# Patient Record
Sex: MALE | Race: WHITE | NOT HISPANIC OR LATINO
[De-identification: names, ages, dates, MRNs, and addresses within clinical notes are randomized per-mention and may not be internally consistent; named-entity substitution may affect disease eponyms.]

---

## 2017-07-26 ENCOUNTER — RESULT REVIEW (OUTPATIENT)
Age: 77
End: 2017-07-26

## 2017-11-08 ENCOUNTER — RESULT REVIEW (OUTPATIENT)
Age: 77
End: 2017-11-08

## 2019-04-01 ENCOUNTER — APPOINTMENT (OUTPATIENT)
Dept: CARDIOLOGY | Facility: CLINIC | Age: 79
End: 2019-04-01

## 2019-04-03 ENCOUNTER — RESULT REVIEW (OUTPATIENT)
Age: 79
End: 2019-04-03

## 2019-04-04 ENCOUNTER — RESULT REVIEW (OUTPATIENT)
Age: 79
End: 2019-04-04

## 2019-04-12 ENCOUNTER — RESULT REVIEW (OUTPATIENT)
Age: 79
End: 2019-04-12

## 2019-04-25 ENCOUNTER — HOSPITAL ENCOUNTER (INPATIENT)
Dept: HOSPITAL 74 - JER | Age: 79
DRG: 208 | End: 2019-04-25
Attending: GENERAL ACUTE CARE HOSPITAL | Admitting: GENERAL ACUTE CARE HOSPITAL
Payer: COMMERCIAL

## 2019-04-25 VITALS — TEMPERATURE: 97.7 F | DIASTOLIC BLOOD PRESSURE: 64 MMHG | SYSTOLIC BLOOD PRESSURE: 92 MMHG

## 2019-04-25 VITALS — HEART RATE: 130 BPM

## 2019-04-25 VITALS — BODY MASS INDEX: 14.5 KG/M2

## 2019-04-25 DIAGNOSIS — E78.5: ICD-10-CM

## 2019-04-25 DIAGNOSIS — Z66: ICD-10-CM

## 2019-04-25 DIAGNOSIS — E87.2: ICD-10-CM

## 2019-04-25 DIAGNOSIS — I50.9: ICD-10-CM

## 2019-04-25 DIAGNOSIS — D64.9: ICD-10-CM

## 2019-04-25 DIAGNOSIS — Z79.84: ICD-10-CM

## 2019-04-25 DIAGNOSIS — R64: ICD-10-CM

## 2019-04-25 DIAGNOSIS — J44.1: ICD-10-CM

## 2019-04-25 DIAGNOSIS — G93.41: ICD-10-CM

## 2019-04-25 DIAGNOSIS — E11.9: ICD-10-CM

## 2019-04-25 DIAGNOSIS — J96.22: Primary | ICD-10-CM

## 2019-04-25 DIAGNOSIS — I11.0: ICD-10-CM

## 2019-04-25 DIAGNOSIS — L98.429: ICD-10-CM

## 2019-04-25 LAB
ALBUMIN SERPL-MCNC: 2.2 G/DL (ref 3.4–5)
ALP SERPL-CCNC: 68 U/L (ref 45–117)
ALT SERPL-CCNC: 15 U/L (ref 13–61)
ANION GAP SERPL CALC-SCNC: 3 MMOL/L (ref 8–16)
ANISOCYTOSIS BLD QL: (no result)
APPEARANCE UR: (no result)
APTT BLD: 29 SECONDS (ref 25.2–36.5)
ARTERIAL BLOOD GAS PCO2: 163 MMHG (ref 35–45)
ARTERIAL BLOOD GAS PCO2: 39.9 MMHG (ref 35–45)
ARTERIAL PATENCY WRIST A: POSITIVE
AST SERPL-CCNC: 14 U/L (ref 15–37)
BACTERIA #/AREA URNS HPF: 15.2 /HPF
BASE EXCESS BLDA CALC-SCNC: 6.5 MEQ/L (ref -2–2)
BASE EXCESS BLDA CALC-SCNC: 9 MEQ/L (ref -2–2)
BILIRUB SERPL-MCNC: 0.3 MG/DL (ref 0.2–1)
BILIRUB UR STRIP.AUTO-MCNC: NEGATIVE MG/DL
BNP SERPL-MCNC: 1086.2 PG/ML (ref 5–450)
BUN SERPL-MCNC: 34 MG/DL (ref 7–18)
CALCIUM SERPL-MCNC: 8.2 MG/DL (ref 8.5–10.1)
CASTS #/AREA URNS LPF: 14 /LPF (ref 0–8)
CHLORIDE SERPL-SCNC: 96 MMOL/L (ref 98–107)
CO2 SERPL-SCNC: > 45 MMOL/L (ref 21–32)
COHGB MFR BLD: 0.5 % (ref 0–2)
COLOR UR: (no result)
CREAT SERPL-MCNC: 0.8 MG/DL (ref 0.55–1.3)
DEPRECATED RDW RBC AUTO: 17.2 % (ref 11.9–15.9)
EPITH CASTS URNS QL MICRO: 1.7 /HPF
GLUCOSE SERPL-MCNC: 130 MG/DL (ref 74–106)
HCT VFR BLD CALC: 31.5 % (ref 35.4–49)
HGB BLD-MCNC: 9.9 GM/DL (ref 11.7–16.9)
INR BLD: 0.85 (ref 0.83–1.09)
KETONES UR QL STRIP: (no result)
LEUKOCYTE ESTERASE UR QL STRIP.AUTO: (no result)
MACROCYTES BLD QL: (no result)
MAGNESIUM SERPL-MCNC: 2.2 MG/DL (ref 1.8–2.4)
MCH RBC QN AUTO: 28.8 PG (ref 25.7–33.7)
MCHC RBC AUTO-ENTMCNC: 31.3 G/DL (ref 32–35.9)
MCV RBC: 92 FL (ref 80–96)
NITRITE UR QL STRIP: NEGATIVE
OVALOCYTES BLD QL SMEAR: (no result)
PH UR: 7 [PH] (ref 5–8)
PLATELET # BLD AUTO: 258 K/MM3 (ref 134–434)
PLATELET BLD QL SMEAR: NORMAL
PMV BLD: 8 FL (ref 7.5–11.1)
PO2 BLDA: 396 MMHG (ref 80–105)
PO2 BLDA: 58 MMHG (ref 80–105)
POTASSIUM SERPLBLD-SCNC: 3.5 MMOL/L (ref 3.5–5.1)
PROT SERPL-MCNC: 4.6 G/DL (ref 6.4–8.2)
PROT UR QL STRIP: (no result)
PROT UR QL STRIP: NEGATIVE
PT PNL PPP: 10 SEC (ref 9.7–13)
RBC # BLD AUTO: 3.42 M/MM3 (ref 4–5.6)
RBC # BLD AUTO: 952.7 /HPF (ref 0–4)
SAO2 % BLDA: 81.2 % (ref 95–98)
SAO2 % BLDA: 99.6 % (ref 95–98)
SODIUM SERPL-SCNC: 144 MMOL/L (ref 136–145)
SP GR UR: 1.01 (ref 1.01–1.03)
UROBILINOGEN UR STRIP-MCNC: 0.2 MG/DL (ref 0.2–1)
WBC # BLD AUTO: 6.7 K/MM3 (ref 4–10)
WBC # UR AUTO: 319 /HPF (ref 0–5)
YEAST #/AREA URNS HPF: PRESENT /[HPF]

## 2019-04-25 PROCEDURE — 0BH17EZ INSERTION OF ENDOTRACHEAL AIRWAY INTO TRACHEA, VIA NATURAL OR ARTIFICIAL OPENING: ICD-10-PCS | Performed by: EMERGENCY MEDICINE

## 2019-04-25 PROCEDURE — 5A09357 ASSISTANCE WITH RESPIRATORY VENTILATION, LESS THAN 24 CONSECUTIVE HOURS, CONTINUOUS POSITIVE AIRWAY PRESSURE: ICD-10-PCS | Performed by: EMERGENCY MEDICINE

## 2019-04-25 PROCEDURE — B513ZZA FLUOROSCOPY OF RIGHT JUGULAR VEINS, GUIDANCE: ICD-10-PCS | Performed by: INTERNAL MEDICINE

## 2019-04-25 PROCEDURE — 3E0F7GC INTRODUCTION OF OTHER THERAPEUTIC SUBSTANCE INTO RESPIRATORY TRACT, VIA NATURAL OR ARTIFICIAL OPENING: ICD-10-PCS | Performed by: EMERGENCY MEDICINE

## 2019-04-25 PROCEDURE — 0BH172Z INSERTION OF MONITORING DEVICE INTO TRACHEA, VIA NATURAL OR ARTIFICIAL OPENING: ICD-10-PCS | Performed by: EMERGENCY MEDICINE

## 2019-04-25 PROCEDURE — 5A1935Z RESPIRATORY VENTILATION, LESS THAN 24 CONSECUTIVE HOURS: ICD-10-PCS | Performed by: EMERGENCY MEDICINE

## 2019-04-25 PROCEDURE — 05HM33Z INSERTION OF INFUSION DEVICE INTO RIGHT INTERNAL JUGULAR VEIN, PERCUTANEOUS APPROACH: ICD-10-PCS | Performed by: INTERNAL MEDICINE

## 2019-04-25 NOTE — PROC
Central Line Insertion


Indication: Vasopressor


Risks and Benefits Explained: Yes


Consent on Chart: Yes


Central Line: Triple Lumen Catheter


Anesthesia: 1% Lidocaine


Sterile Technique: Yes


Ultrasound Guided Assistance: Yes


Position: Right Internal Jugular


Post Insertion: Yes: Chest X-Ray Ordered


Sterile Dressing Applied: Yes

## 2019-04-25 NOTE — HP
CHIEF COMPLAINT:





PCP:





HISTORY OF PRESENT ILLNESS:


This is an 79 yo M with PMH of COPD, asthma, CHF, HLD, recently hospitalized at 

[place] mar 30-apr 13 with pna, pleural effusions with chest tube, presenting 

from [facility] due to AMS and respiratory distress. EMS states that patient was 

found to be unresponsive at 845 am, was seen aaox3 last night. condition did 

not improve with nebs and nonrebreather. In ED patient was shown to be in 

hypercarbic respiratory failure pH 7.05 and pCO2 of 163 on abg. was intubated 

peep 5 tidal vol 500, fio2 100%, rate 18. Patient became hypotenisve and 

bradycardic MAP of 50 - 60, received 1 epi, bicarb and 2L iv ns. 2 IO were 

established and he was started on levo @ 15 and fentanyl. ESTEBAN stabilized to 150'

s systolic and hr 130's








ER course was notable for:


(1)cxr ekg


(2)labs


(3)as above 





Recent Travel:unknown





PAST MEDICAL HISTORY: as above


 


PAST SURGICAL HISTORY: as above 





Social History:


Smoking: unknown 


Alcohol:unknown


Drugs: unknown





Family History: unknown


Allergies





No Known Allergies Allergy (Verified 04/25/19 09:32)


 








HOME MEDICATIONS:


 Home Medications











 Medication  Instructions  Recorded


 


Aspirin Coated [Ecotrin] 81 mg PO DAILY 10/07/12


 


Atorvastatin Ca [Lipitor] 10 mg PO DAILY 10/07/12


 


Enalapril Maleate [Vasotec] 10 mg PO DAILY 10/07/12


 


predniSONE [Deltasone -] 10 mg PO DAILY 10/07/12








REVIEW OF SYSTEMS


unable to obtain 








PHYSICAL EXAMINATION


 Vital Signs - 24 hr











  04/25/19





  09:22


 


Pulse Rate 116 H


 


Respiratory 14





Rate 


 


Blood Pressure 90/47 L


 


O2 Sat by Pulse 100





Oximetry (%) 











GENERAL: sedated, cachectic


HEAD: Normal with no signs of trauma.


EYES: Pupils pinpoint, conjunctiva clear


EARS, NOSE, THROAT: somewhat dry mucous membranes.


NECK: supple + JVD


LUNGS: dissude ronchi, may be upper respiratory secretions 


HEART: tachy rate and regular rhythm, normal S1 and S2 


ABDOMEN: Soft, not distended, globally decreased bowel sounds, no masses.  


MUSCULOSKELETAL: No bony deformities 


UPPER EXTREMITIES: No peripheral edema.


LOWER EXTREMITIES: No peripheral edema. 


NEUROLOGICAL:  unable to assess 


PSYCHIATRIC: unable to assess


SKIN: Warm, dry, decreased turgor


 Laboratory Results - last 24 hr











  04/25/19 04/25/19 04/25/19





  09:42 10:00 10:00


 


WBC   6.7 


 


RBC   3.42 L 


 


Hgb   9.9 L 


 


Hct   31.5 L 


 


MCV   92.0 


 


MCH   28.8 


 


MCHC   31.3 L 


 


RDW   17.2 H 


 


Plt Count   258 


 


MPV   8.0 


 


Absolute Neuts (auto)   5.0 


 


Neutrophils %   No Result Required. 


 


Lymphocytes %   No Result Required. 


 


Nucleated RBC %   0 


 


PT with INR    10.00


 


INR    0.85


 


PTT (Actin FS)    29.0


 


Anticoagulation Therapy   


 


Puncture Site   


 


ABG pH   


 


ABG pCO2 at Pt Temp   


 


ABG pO2 at Pt Temp   


 


ABG HCO3   


 


ABG O2 Sat (Measured)   


 


ABG O2 Content   


 


ABG Base Excess   


 


Santino Test   


 


Carboxyhemoglobin   


 


Methemoglobin   


 


O2 Delivery Device   


 


Oxygen Flow Rate   


 


Vent Mode   


 


Vent Rate   


 


Mechanical Rate   


 


Pressure Support Vent   


 


Sodium   


 


Potassium   


 


Chloride   


 


Carbon Dioxide   


 


Anion Gap   


 


BUN   


 


Creatinine   


 


Creat Clearance w eGFR   


 


POC Glucometer  121  


 


Random Glucose   


 


Lactic Acid   


 


Calcium   


 


Total Bilirubin   


 


AST   


 


ALT   


 


Alkaline Phosphatase   


 


Creatine Kinase   


 


B-Natriuretic Peptide   


 


Total Protein   


 


Albumin   














  04/25/19 04/25/19 04/25/19





  10:00 10:00 10:00


 


WBC   


 


RBC   


 


Hgb   


 


Hct   


 


MCV   


 


MCH   


 


MCHC   


 


RDW   


 


Plt Count   


 


MPV   


 


Absolute Neuts (auto)   


 


Neutrophils %   


 


Lymphocytes %   


 


Nucleated RBC %   


 


PT with INR   


 


INR   


 


PTT (Actin FS)   


 


Anticoagulation Therapy   


 


Puncture Site   


 


ABG pH   


 


ABG pCO2 at Pt Temp   


 


ABG pO2 at Pt Temp   


 


ABG HCO3   


 


ABG O2 Sat (Measured)   


 


ABG O2 Content   


 


ABG Base Excess   


 


Santino Test   


 


Carboxyhemoglobin   


 


Methemoglobin   


 


O2 Delivery Device   


 


Oxygen Flow Rate   


 


Vent Mode   


 


Vent Rate   


 


Mechanical Rate   


 


Pressure Support Vent   


 


Sodium  144  


 


Potassium  3.5  


 


Chloride  96 L  


 


Carbon Dioxide  > 45 H  


 


Anion Gap  3 L  


 


BUN  34 H  


 


Creatinine  0.8  


 


Creat Clearance w eGFR  93.01  


 


POC Glucometer   


 


Random Glucose  130 H  


 


Lactic Acid   0.8 


 


Calcium  8.2 L  


 


Total Bilirubin  0.3  


 


AST  14 L  


 


ALT  15  


 


Alkaline Phosphatase  68  


 


Creatine Kinase  28  


 


B-Natriuretic Peptide    1086.2 H


 


Total Protein  4.6 L  


 


Albumin  2.2 L  














  04/25/19





  10:11


 


WBC 


 


RBC 


 


Hgb 


 


Hct 


 


MCV 


 


MCH 


 


MCHC 


 


RDW 


 


Plt Count 


 


MPV 


 


Absolute Neuts (auto) 


 


Neutrophils % 


 


Lymphocytes % 


 


Nucleated RBC % 


 


PT with INR 


 


INR 


 


PTT (Actin FS) 


 


Anticoagulation Therapy  No Result Required.


 


Puncture Site  No Result Required.


 


ABG pH  7.05 L*


 


ABG pCO2 at Pt Temp  163 H*


 


ABG pO2 at Pt Temp  58.0 L


 


ABG HCO3  43.5 H


 


ABG O2 Sat (Measured)  81.2 L


 


ABG O2 Content  11.3 L


 


ABG Base Excess  9.0 H


 


Santino Test  No Result Required.


 


Carboxyhemoglobin  0.5


 


Methemoglobin  0.8


 


O2 Delivery Device  No Result Required.


 


Oxygen Flow Rate  No Result Required.


 


Vent Mode  No Result Required.


 


Vent Rate  No Result Required.


 


Mechanical Rate  No Result Required.


 


Pressure Support Vent  No Result Required.


 


Sodium 


 


Potassium 


 


Chloride 


 


Carbon Dioxide 


 


Anion Gap 


 


BUN 


 


Creatinine 


 


Creat Clearance w eGFR 


 


POC Glucometer 


 


Random Glucose 


 


Lactic Acid 


 


Calcium 


 


Total Bilirubin 


 


AST 


 


ALT 


 


Alkaline Phosphatase 


 


Creatine Kinase 


 


B-Natriuretic Peptide 


 


Total Protein 


 


Albumin 











ASSESSMENT/PLAN:


This is an 79 yo M with PMH of COPD, asthma, CHF, HLD, recently hospitalized at 

[place] mar 30-apr 13 with pna, pleural effusions with chest tube, presenting 

from [facility] due to AMS and respiratory distress. 





acute hypercapneic respiratory failure 


copd exacerbation 


chf, currently euvolemic 


hld 


-most likely copd exacerbation causing metabolic encephalopathy due to 

hypercpnea and acidosis, leading to autonomic dysfunction


-intubated, currently breath stacking, start on propofol with fentanyl, check 

another abg and adjust vent setting accordingly 


-nebs, medrol and iv abx per ID consult


-wean levo below 12 add vaso


-f/u sputum and blood cultures 


-hep ppx 


-daily cxr 


-hold additional ivf for now

## 2019-04-25 NOTE — PDOC
Documentation entered by Kamala Zuñiga SCRIBE, acting as scribe for Pancho Sumner MD.








Pancho Sumner MD:  This documentation has been prepared by the Yogesh beavers Amanda, SCRIBE, under my direction and personally reviewed by me in its entirety.  I 

confirm that the documentation accurately reflects all work, treatment, 

procedures, and medical decision making performed by me.  





Attending Attestation





- Resident


Resident Name: Aaron Balbuena





- ED Attending Attestation


I have performed the following: I have examined & evaluated the patient, The 

case was reviewed & discussed with the resident, I agree w/resident's findings 

& plan, Exceptions are as noted





- HPI


HPI: 





04/25/19 10:57


78 M with h/o asthma/COPD, CHF, DM, HTN, HLD presenting to ED in respiratory 

distress. Per daughter, pt was recently diagnosed with PNA. Over the past 

several days, he has had worsening difficulty breathing. At the NH, they were 

giving him nebulizer treatments every 4 hours. However, daughter notes that 

last night, he appeared to be getting very tired. Today, EMS was called for 

respiratory distress.


Per EMS, pt was encountered obtunded, satting 70%. He was given O2 via NRB and 

brought to ED.





In ED, pt arrived unresponsive, with minimal air movement.


Vitals upon arrival notable for O2 80% on NRB, BP 80/40.


Bilateral tibial IOs placed due to poor venous access and multiple failed 

attempts at peripheral IVs


Pt was given 1L NS, duonebs, solumedrol.


Trial of BiPAP with improvement in O2 to 100% but persistently obtunded


BP improved to 110/60 after 1L NS





Ketamine and rocuronium given for RSI


Pt intubated using DL with 7.5 ET tube





Post-intubation, pt became bradycardic and hypotensive.


Epi 1mg IV given, with improvement in vitals





Pt started on levophed gtt





- Physicial Exam


PE: 





04/25/19 11:09


GENERAL: Unresponsive, breathing agonally


HEAD: No signs of trauma


EYES: PERRLA, EOMI, sclera anicteric, conjunctiva clear


ENT: Auricles normal inspection, hearing grossly normal, nares patent, 

oropharynx clear without exudates. Moist mucosa


NECK: Nontender, no stepoffs, Normal ROM, supple, no lymphadenopathy, JVD, or 

masses


LUNGS: + wheezing, poor air movement


HEART: Regular rate and rhythm, normal S1 and S2, no murmurs, rubs or gallops


ABDOMEN: Soft, nontender, normoactive bowel sounds.  No guarding, no rebound.  

No masses


EXTREMITIES: Normal range of motion, no edema.  No clubbing or cyanosis. No 

cords, erythema, or tenderness


NEUROLOGICAL: unable to assess


SKIN: Warm, Dry, normal turgor, no rashes or lesions noted.





- Critical Care Time


Total Critical Care Time: 120


Critical Care Statement: The care of this patient involved high complexity 

decision making to prevent further life threatening deterioration of the patient

's condition and/or to evaluate & treat vital organ system(s) failure or risk 

of failure.





- Medical Decision Making





04/25/19 11:15


78 M with respiratory failure, likely 2/2 severe COPD exacerbation. No clinical 

signs of volume overload on exam.


ABG with CO2 >160, pH 7.05


Will continue nebs, steroids


Pt now intubated on levo gtt


Hypotension and bradycardia during intubation likely 2/2 severe acidosis


However, will cover empirically for sepsis as well





Pt to be admitted to ICU





04/25/19 11:42


Pt accepted to ICU

## 2019-04-25 NOTE — PDOC
History of Present Illness





- General


Chief Complaint: Shortness of Breath


Stated Complaint: Shortness of Breath


History Source: Patient


Exam Limitations: No Limitations





- History of Present Illness


Initial Comments: 





04/25/19 10:43


77 yo M with a hx of HLD, COPD vs asthma, and CHF presents to the emergency 

department with AMS with respiratory depression. Per EMS, the patient was last 

seen normal yesterday and per the nursing staff he was not responding with 

increased respiratory rate while on non-rebreather. In the emergency department

, the patient was not responsive to voice and stimuli. Unable to assess ROS. 











Past History





- Past Medical History


Allergies/Adverse Reactions: 


 Allergies











Allergy/AdvReac Type Severity Reaction Status Date / Time


 


amoxicillin Allergy Unknown  Verified 04/25/19 12:52











Home Medications: 


Ambulatory Orders





Atorvastatin Ca [Lipitor] 10 mg PO HS 04/25/19 


Calcium Carbonate/Vitamin D3 [Calcium 600-Vit D3 400 Tablet] 1 each PO DAILY 04/ 25/19 


Docusate Sodium [Colace] 100 mg PO BID 04/25/19 


Fluticasone/Salmeterol [Advair 250-50 Diskus] 1 each IH BID 04/25/19 


Furosemide [Lasix] 40 mg PO DAILY 04/25/19 


Ipratropium/Albuterol Sulfate [Iprat-Albut 0.5-3(2.5) mg/3 ml] 3 ml IH Q4H 04/25 /19 


Metformin HCl [Metformin HCl ER] 500 mg PO BID 04/25/19 


Nut.tx.gluc.intoler,Lac-Fr,Soy [Glucerna Advance] 240 ml PO DAILY 04/25/19 


Prednisone 5 mg PO BID 04/25/19 


Sennosides [Sen-O-Tab] 8.6 mg PO HS 04/25/19 


Tamsulosin HCl 0.4 mg PO HS 04/25/19 


Tiotropium Bromide [Spiriva] 2 inh PO DAILY 04/25/19 


Triamcinolone 0.025% Cream [Aristocort] 0 gm TP DAILY 04/25/19 








Asthma: Yes


COPD: No


HTN: Yes


Hypercholesterolemia: Yes





- Immunization History


Td Vaccination: Yes (WITHIN 5 YRS)


Immunization Up to Date: Yes





- Suicide/Smoking/Psychosocial Hx


Smoking Status: Yes


Smoking History: Unknown if ever smoked


Have you smoked in the past 12 months: No


Number of Cigarettes Smoked Daily: 0


Information on smoking cessation initiated: No


Hx Alcohol Use: No


Drug/Substance Use Hx: No


Substance Use Type: None





**Review of Systems





- Review of Systems


Able to Perform ROS?: No (AMS/Unresponsive/Intubate)





*Physical Exam





- Vital Signs


 Last Vital Signs











Temp Pulse Resp BP Pulse Ox


 


    116 H  14   90/47 L  100 


 


    04/25/19 09:22  04/25/19 09:22  04/25/19 09:22  04/25/19 09:22














- Physical Exam


General Appearance: Yes: Appropriately Dressed, Disheveled, Cachetic, Thin.  No

: Nourished, Apparent Distress


HEENT: negative: EOMI, JAZZ (right eye reactive to light. Left pupil more 

dilated than right significantly without reaction to light), Pharynx Normal (

dry mucous membranes), Scleral Icterus (R), Scleral Icterus (L), Pharyngeal 

Erythema, Tonsillar Exudate, Tonsillar Erythema


Neck: positive: Trachea midline, Supple, Other (kyphotic ).  negative: 

Lymphadenopathy (R), Lymphadenopathy (L)


Respiratory/Chest: positive: Respiratory Distress, Accessory Muscle Use, 

Labored Respiration, Rapid RR, Decreased Breath Sounds, Rhonchi, Wheezing.  

negative: Lungs Clear, Normal Breath Sounds


Cardiovascular: positive: S1, S2, Tachycardia, Irregularly Irregular


Gastrointestinal/Abdominal: positive: Normal Bowel Sounds, Flat, Soft.  negative

: Tender


Lymphatic: negative: Adenopathy


Musculoskeletal: positive: Other (multiple eccymosis throughout arms. left 

incisicion old on left side 4-5th intercostal space. multiple stage 2 ulcers on 

back and left buttock)


Extremity: positive: Coldness, Delayed Capillary Refill.  negative: Normal 

Capillary Refill (decreased)


Integumentary: positive: Dry, Warm, Pale


Neurologic: negative: Fully Oriented, Alert, Normal Mood/Affect, Normal Response





Procedures





- Central Line


Central Line Lumen: triple


Central Line Position: internal jugular (R)


Anesthesia: 1% Lidocaine


Amount of anesthesia (ccs): 3


Complications: none


Post Central Line Insertion: sutured, good blood return, position confirmed w/ 

CXR





- Intubation


Intubation Method: orotracheal


Blade used: Mac


Tube Size (Fr): 7.5


Medications: Ketamine, Rocuronium


Tube position @ lip (cm): 22


Tube position confirmed by: Direct visualization, CO2 detector, Chest x-ray, 

Breath sounds


Breath Sounds after Intubation: equal


Intubation Complications: no complications


Post Intubation Xray: Yes (in place)





ED Treatment Course





- LABORATORY


CBC & Chemistry Diagram: 


 04/25/19 10:00





 04/25/19 10:00





- ADDITIONAL ORDERS


Additional order review: 


 Laboratory  Results











  04/25/19 04/25/19





  10:11 09:42


 


Anticoagulation Therapy  No Result Required. 


 


Puncture Site  No Result Required. 


 


ABG pH  7.05 L* 


 


ABG pCO2 at Pt Temp  163 H* 


 


ABG pO2 at Pt Temp  58.0 L 


 


ABG HCO3  43.5 H 


 


ABG O2 Sat (Measured)  81.2 L 


 


ABG O2 Content  11.3 L 


 


ABG Base Excess  9.0 H 


 


Santino Test  No Result Required. 


 


Carboxyhemoglobin  0.5 


 


Methemoglobin  0.8 


 


O2 Delivery Device  No Result Required. 


 


Oxygen Flow Rate  No Result Required. 


 


Vent Mode  No Result Required. 


 


Vent Rate  No Result Required. 


 


Mechanical Rate  No Result Required. 


 


Pressure Support Vent  No Result Required. 


 


POC Glucometer   121








 











  04/25/19





  09:42


 


POC Glucometer  121














Medical Decision Making





- Medical Decision Making





77 yo M with a hx of HLD, COPD vs asthma, and CHF presents to the emergency 

department with AMS with respiratory depression. Per the nursing staff, they 

went to his room at approximately 8:45 am. Nursing supervisor noted he was 

unresponsive with shallow breathing. Given nebulizer treatment and non-

rebreather. He was recently admitted to [facility] from [facility] on April 23rd. He 

was admitted to [facility] on March 30th for PNA and pleural effusion s/p 

chest tube (laterality unknown at this time). 





Initial vitals:


 Initial Vital Signs











Pulse Resp BP Pulse Ox


 


 116 H  14   90/47 L  100 


 


 04/25/19 09:22  04/25/19 09:22  04/25/19 09:22  04/25/19 09:22








Work up:


Patient presents to the emergency department in acute respiratory distress with 

altered mental status per EMS. I evaluated the patient and he was unresponsive 

to touch and voice. Patient had increased respirations with appropriate 

saturations (95% and greater) and using accessory muscles.





ddx for acute respiratory failure is sepsis vs COPD exacerbation vs PNA vs 

aspiration PNA vs HCAP (recently admitted to [facility]) vs CHF 

exacerbation. Was given solumedrol, duonebs. BG within normal limits. 





Patient was placed on BiPap and 2 L of NS were given to improve BP due to MAPs 

in the 50s. BP was improved with 2 L of NS bolus with MAPs >65. A decision was 

made by the family of the patient to intubate after advisement was given on the 

risks and benefits of the procedure. He was given bicarb for suspected acidosis 

given clinical impression and hx. The patient was successfully intubated on 

first attempt 7.5 tube with ketamine and rocuronium for sedation/paralysis. 





Patient began to have bradycardia and hypotensive. He was given epinephrine. 

Levophed was started when pressures were not changed by the epi with starting 

rate at 20 mcgs.





 Laboratory Tests











  04/25/19 04/25/19 04/25/19





  09:42 10:00 10:00


 


WBC   6.7 


 


RBC   3.42 L 


 


Hgb   9.9 L 


 


Hct   31.5 L 


 


MCV   92.0 


 


MCH   28.8 


 


MCHC   31.3 L 


 


RDW   17.2 H 


 


Plt Count   258 


 


MPV   8.0 


 


Absolute Neuts (auto)   5.0 


 


Neutrophils %   No Result Required. 


 


Lymphocytes %   No Result Required. 


 


Nucleated RBC %   0 


 


PT with INR    10.00


 


INR    0.85


 


PTT (Actin FS)    29.0


 


Anticoagulation Therapy   


 


Puncture Site   


 


ABG pH   


 


ABG pCO2 at Pt Temp   


 


ABG pO2 at Pt Temp   


 


ABG HCO3   


 


ABG O2 Sat (Measured)   


 


ABG O2 Content   


 


ABG Base Excess   


 


Santino Test   


 


Carboxyhemoglobin   


 


Methemoglobin   


 


O2 Delivery Device   


 


Oxygen Flow Rate   


 


Vent Mode   


 


Vent Rate   


 


Mechanical Rate   


 


Pressure Support Vent   


 


Sodium   


 


Potassium   


 


Chloride   


 


Carbon Dioxide   


 


Anion Gap   


 


BUN   


 


Creatinine   


 


Creat Clearance w eGFR   


 


POC Glucometer  121  


 


Random Glucose   


 


Lactic Acid   


 


Calcium   


 


Magnesium   


 


Total Bilirubin   


 


AST   


 


ALT   


 


Alkaline Phosphatase   


 


Creatine Kinase   


 


Troponin I   


 


B-Natriuretic Peptide   


 


Total Protein   


 


Albumin   














  04/25/19 04/25/19 04/25/19





  10:00 10:00 10:00


 


WBC   


 


RBC   


 


Hgb   


 


Hct   


 


MCV   


 


MCH   


 


MCHC   


 


RDW   


 


Plt Count   


 


MPV   


 


Absolute Neuts (auto)   


 


Neutrophils %   


 


Lymphocytes %   


 


Nucleated RBC %   


 


PT with INR   


 


INR   


 


PTT (Actin FS)   


 


Anticoagulation Therapy   


 


Puncture Site   


 


ABG pH   


 


ABG pCO2 at Pt Temp   


 


ABG pO2 at Pt Temp   


 


ABG HCO3   


 


ABG O2 Sat (Measured)   


 


ABG O2 Content   


 


ABG Base Excess   


 


Santino Test   


 


Carboxyhemoglobin   


 


Methemoglobin   


 


O2 Delivery Device   


 


Oxygen Flow Rate   


 


Vent Mode   


 


Vent Rate   


 


Mechanical Rate   


 


Pressure Support Vent   


 


Sodium  144  


 


Potassium  3.5  


 


Chloride  96 L  


 


Carbon Dioxide  > 45 H  


 


Anion Gap  3 L  


 


BUN  34 H  


 


Creatinine  0.8  


 


Creat Clearance w eGFR  93.01  


 


POC Glucometer   


 


Random Glucose  130 H  


 


Lactic Acid   0.8 


 


Calcium  8.2 L  


 


Magnesium    2.2


 


Total Bilirubin  0.3  


 


AST  14 L  


 


ALT  15  


 


Alkaline Phosphatase  68  


 


Creatine Kinase  28  


 


Troponin I    0.02


 


B-Natriuretic Peptide    1086.2 H


 


Total Protein  4.6 L  


 


Albumin  2.2 L  














  04/25/19





  10:11


 


WBC 


 


RBC 


 


Hgb 


 


Hct 


 


MCV 


 


MCH 


 


MCHC 


 


RDW 


 


Plt Count 


 


MPV 


 


Absolute Neuts (auto) 


 


Neutrophils % 


 


Lymphocytes % 


 


Nucleated RBC % 


 


PT with INR 


 


INR 


 


PTT (Actin FS) 


 


Anticoagulation Therapy  No Result Required.


 


Puncture Site  No Result Required.


 


ABG pH  7.05 L*


 


ABG pCO2 at Pt Temp  163 H*


 


ABG pO2 at Pt Temp  58.0 L


 


ABG HCO3  43.5 H


 


ABG O2 Sat (Measured)  81.2 L


 


ABG O2 Content  11.3 L


 


ABG Base Excess  9.0 H


 


Santino Test  No Result Required.


 


Carboxyhemoglobin  0.5


 


Methemoglobin  0.8


 


O2 Delivery Device  No Result Required.


 


Oxygen Flow Rate  No Result Required.


 


Vent Mode  No Result Required.


 


Vent Rate  No Result Required.


 


Mechanical Rate  No Result Required.


 


Pressure Support Vent  No Result Required.


 


Sodium 


 


Potassium 


 


Chloride 


 


Carbon Dioxide 


 


Anion Gap 


 


BUN 


 


Creatinine 


 


Creat Clearance w eGFR 


 


POC Glucometer 


 


Random Glucose 


 


Lactic Acid 


 


Calcium 


 


Magnesium 


 


Total Bilirubin 


 


AST 


 


ALT 


 


Alkaline Phosphatase 


 


Creatine Kinase 


 


Troponin I 


 


B-Natriuretic Peptide 


 


Total Protein 


 


Albumin 








No leukocytosis. anemia. ABG shows pH 7.05 and pco2 163. bicarb >45, likely 

compensating for acidosis. presumed to be respiratory acidosis. trop negative. 

EKG does not show ST elevations, but ST depressions in V4-V6 irregular rhythm. 

bnp elevated. lactic acid within normal limits. 





On secondary survey, patient noted to have unequal pupils L>R. Right reactive 

to light. left is not reactive to light. multiple stage 2 ulcers on back. 

Ordered head CT for evaluation of head bleed. 





Patient's BP recovered to MAPs in the 80s. Patient was endorses to hospitalist 

team and was accepted. Patient was endorses to ICU team and at this time, 11:40 

am, they stated they will come evaluated the patient. 





Will start vanco, azithro, and zosyn for empiric treatment for healthcare 

associated pneumonia. 








Dispo:


Admit to ICU








*DC/Admit/Observation/Transfer


Diagnosis at time of Disposition: 


Acute respiratory failure


Qualifiers:


 Respiratory failure complication: hypercapnia Qualified Code(s): J96.02 - 

Acute respiratory failure with hypercapnia








- Referrals





- Patient Instructions





- Post Discharge Activity

## 2019-04-25 NOTE — PN
Teaching Attending Note


Name of Resident: Fatou Cesar





ATTENDING PHYSICIAN STATEMENT





I saw and evaluated the patient.


I reviewed the resident's note and discussed the case with the resident.


I agree with the resident's findings and plan as documented.








SUBJECTIVE: Unresponsive, unable to participate in medical interview.








OBJECTIVE: Afebrile, tachycardic, hypotensive





 Last Vital Signs











Temp Pulse Resp BP Pulse Ox


 


 97.7 F   138 H  24 H  92/64   100 


 


 04/25/19 15:57  04/25/19 14:00  04/25/19 15:57  04/25/19 15:57  04/25/19 14:00








HEENT - Atraumatic, Normocephalic


Heart - S1, S2, SM


Lungs - decreased air entry bilaterally


Abdomen - Soft, bowel Sounds normal.


Extremities - no edema, no calf tenderness





 Laboratory Results - last 24 hr











  04/25/19 04/25/19 04/25/19





  09:42 10:00 10:00


 


WBC   6.7 


 


RBC   3.42 L 


 


Hgb   9.9 L 


 


Hct   31.5 L 


 


MCV   92.0 


 


MCH   28.8 


 


MCHC   31.3 L 


 


RDW   17.2 H 


 


Plt Count   258 


 


MPV   8.0 


 


Absolute Neuts (auto)   5.0 


 


Neutrophils %   No Result Required. 


 


Neutrophils % (Manual)   77.9 


 


Band Neutrophils %   2.1 


 


Lymphocytes %   No Result Required. 


 


Lymphocytes % (Manual)   14.7 


 


Monocytes % (Manual)   4 


 


Eosinophils % (Manual)   0.0 


 


Basophils % (Manual)   0.0 


 


Myelocytes % (Man)   1 


 


Promyelocytes % (Man)   0 


 


Blast Cells % (Manual)   0 


 


Nucleated RBC %   0 


 


Metamyelocytes   0 


 


Hypochromia   0 


 


Platelet Estimate   Normal 


 


Polychromasia   0 


 


Poikilocytosis   1+ 


 


Anisocytosis   1+ 


 


Microcytosis   0 


 


Macrocytosis   1+ 


 


Ovalocytes   1+ 


 


PT with INR    10.00


 


INR    0.85


 


PTT (Actin FS)    29.0


 


Anticoagulation Therapy   


 


Puncture Site   


 


ABG pH   


 


ABG pCO2 at Pt Temp   


 


ABG pO2 at Pt Temp   


 


ABG HCO3   


 


ABG O2 Sat (Measured)   


 


ABG O2 Content   


 


ABG Base Excess   


 


Santino Test   


 


Carboxyhemoglobin   


 


Methemoglobin   


 


O2 Delivery Device   


 


Oxygen Flow Rate   


 


Vent Mode   


 


Vent Rate   


 


Mechanical Rate   


 


PEEP   


 


Pressure Support Vent   


 


Sodium   


 


Potassium   


 


Chloride   


 


Carbon Dioxide   


 


Anion Gap   


 


BUN   


 


Creatinine   


 


Creat Clearance w eGFR   


 


POC Glucometer  121  


 


Random Glucose   


 


Lactic Acid   


 


Calcium   


 


Magnesium   


 


Total Bilirubin   


 


AST   


 


ALT   


 


Alkaline Phosphatase   


 


Creatine Kinase   


 


Troponin I   


 


B-Natriuretic Peptide   


 


Total Protein   


 


Albumin   


 


Urine Color   


 


Urine Appearance   


 


Urine pH   


 


Ur Specific Gravity   


 


Urine Protein   


 


Urine Glucose (UA)   


 


Urine Ketones   


 


Urine Blood   


 


Urine Nitrite   


 


Urine Bilirubin   


 


Urine Urobilinogen   


 


Ur Leukocyte Esterase   


 


Urine WBC (Auto)   


 


Urine RBC (Auto)   


 


Urine Casts (Auto)   


 


U Pathogenic Cast Auto   


 


U Epithel Cells (Auto)   


 


Urine Bacteria (Auto)   


 


Urine Yeast (Auto)   


 


Blood Type   


 


Antibody Screen   














  04/25/19 04/25/19 04/25/19





  10:00 10:00 10:00


 


WBC   


 


RBC   


 


Hgb   


 


Hct   


 


MCV   


 


MCH   


 


MCHC   


 


RDW   


 


Plt Count   


 


MPV   


 


Absolute Neuts (auto)   


 


Neutrophils %   


 


Neutrophils % (Manual)   


 


Band Neutrophils %   


 


Lymphocytes %   


 


Lymphocytes % (Manual)   


 


Monocytes % (Manual)   


 


Eosinophils % (Manual)   


 


Basophils % (Manual)   


 


Myelocytes % (Man)   


 


Promyelocytes % (Man)   


 


Blast Cells % (Manual)   


 


Nucleated RBC %   


 


Metamyelocytes   


 


Hypochromia   


 


Platelet Estimate   


 


Polychromasia   


 


Poikilocytosis   


 


Anisocytosis   


 


Microcytosis   


 


Macrocytosis   


 


Ovalocytes   


 


PT with INR   


 


INR   


 


PTT (Actin FS)   


 


Anticoagulation Therapy   


 


Puncture Site   


 


ABG pH   


 


ABG pCO2 at Pt Temp   


 


ABG pO2 at Pt Temp   


 


ABG HCO3   


 


ABG O2 Sat (Measured)   


 


ABG O2 Content   


 


ABG Base Excess   


 


Santino Test   


 


Carboxyhemoglobin   


 


Methemoglobin   


 


O2 Delivery Device   


 


Oxygen Flow Rate   


 


Vent Mode   


 


Vent Rate   


 


Mechanical Rate   


 


PEEP   


 


Pressure Support Vent   


 


Sodium  144  


 


Potassium  3.5  


 


Chloride  96 L  


 


Carbon Dioxide  > 45 H  


 


Anion Gap  3 L  


 


BUN  34 H  


 


Creatinine  0.8  


 


Creat Clearance w eGFR  93.01  


 


POC Glucometer   


 


Random Glucose  130 H  


 


Lactic Acid   0.8 


 


Calcium  8.2 L  


 


Magnesium   


 


Total Bilirubin  0.3  


 


AST  14 L  


 


ALT  15  


 


Alkaline Phosphatase  68  


 


Creatine Kinase  28  


 


Troponin I   


 


B-Natriuretic Peptide   


 


Total Protein  4.6 L  


 


Albumin  2.2 L  


 


Urine Color   


 


Urine Appearance   


 


Urine pH   


 


Ur Specific Gravity   


 


Urine Protein   


 


Urine Glucose (UA)   


 


Urine Ketones   


 


Urine Blood   


 


Urine Nitrite   


 


Urine Bilirubin   


 


Urine Urobilinogen   


 


Ur Leukocyte Esterase   


 


Urine WBC (Auto)   


 


Urine RBC (Auto)   


 


Urine Casts (Auto)   


 


U Pathogenic Cast Auto   


 


U Epithel Cells (Auto)   


 


Urine Bacteria (Auto)   


 


Urine Yeast (Auto)   


 


Blood Type    A POSITIVE


 


Antibody Screen    Negative














  04/25/19 04/25/19 04/25/19





  10:00 10:11 11:12


 


WBC   


 


RBC   


 


Hgb   


 


Hct   


 


MCV   


 


MCH   


 


MCHC   


 


RDW   


 


Plt Count   


 


MPV   


 


Absolute Neuts (auto)   


 


Neutrophils %   


 


Neutrophils % (Manual)   


 


Band Neutrophils %   


 


Lymphocytes %   


 


Lymphocytes % (Manual)   


 


Monocytes % (Manual)   


 


Eosinophils % (Manual)   


 


Basophils % (Manual)   


 


Myelocytes % (Man)   


 


Promyelocytes % (Man)   


 


Blast Cells % (Manual)   


 


Nucleated RBC %   


 


Metamyelocytes   


 


Hypochromia   


 


Platelet Estimate   


 


Polychromasia   


 


Poikilocytosis   


 


Anisocytosis   


 


Microcytosis   


 


Macrocytosis   


 


Ovalocytes   


 


PT with INR   


 


INR   


 


PTT (Actin FS)   


 


Anticoagulation Therapy   No Result Required. 


 


Puncture Site   No Result Required. 


 


ABG pH   7.05 L* 


 


ABG pCO2 at Pt Temp   163 H* 


 


ABG pO2 at Pt Temp   58.0 L 


 


ABG HCO3   43.5 H 


 


ABG O2 Sat (Measured)   81.2 L 


 


ABG O2 Content   11.3 L 


 


ABG Base Excess   9.0 H 


 


Santino Test   No Result Required. 


 


Carboxyhemoglobin   0.5 


 


Methemoglobin   0.8 


 


O2 Delivery Device   No Result Required. 


 


Oxygen Flow Rate   No Result Required. 


 


Vent Mode   No Result Required. 


 


Vent Rate   No Result Required. 


 


Mechanical Rate   No Result Required. 


 


PEEP   


 


Pressure Support Vent   No Result Required. 


 


Sodium   


 


Potassium   


 


Chloride   


 


Carbon Dioxide   


 


Anion Gap   


 


BUN   


 


Creatinine   


 


Creat Clearance w eGFR   


 


POC Glucometer   


 


Random Glucose   


 


Lactic Acid   


 


Calcium   


 


Magnesium  2.2  


 


Total Bilirubin   


 


AST   


 


ALT   


 


Alkaline Phosphatase   


 


Creatine Kinase   


 


Troponin I  0.02  


 


B-Natriuretic Peptide  1086.2 H  


 


Total Protein   


 


Albumin   


 


Urine Color    Orange


 


Urine Appearance    Cloudy


 


Urine pH    7.0


 


Ur Specific Gravity    1.010


 


Urine Protein    2+ H


 


Urine Glucose (UA)    Negative


 


Urine Ketones    Trace H


 


Urine Blood    3+ H


 


Urine Nitrite    Negative


 


Urine Bilirubin    Negative


 


Urine Urobilinogen    0.2


 


Ur Leukocyte Esterase    3+ H


 


Urine WBC (Auto)    319


 


Urine RBC (Auto)    952.7


 


Urine Casts (Auto)    14


 


U Pathogenic Cast Auto    None seen


 


U Epithel Cells (Auto)    1.7


 


Urine Bacteria (Auto)    15.2


 


Urine Yeast (Auto)    Present


 


Blood Type   


 


Antibody Screen   














  04/25/19 04/25/19





  14:07 14:35


 


WBC  


 


RBC  


 


Hgb  


 


Hct  


 


MCV  


 


MCH  


 


MCHC  


 


RDW  


 


Plt Count  


 


MPV  


 


Absolute Neuts (auto)  


 


Neutrophils %  


 


Neutrophils % (Manual)  


 


Band Neutrophils %  


 


Lymphocytes %  


 


Lymphocytes % (Manual)  


 


Monocytes % (Manual)  


 


Eosinophils % (Manual)  


 


Basophils % (Manual)  


 


Myelocytes % (Man)  


 


Promyelocytes % (Man)  


 


Blast Cells % (Manual)  


 


Nucleated RBC %  


 


Metamyelocytes  


 


Hypochromia  


 


Platelet Estimate  


 


Polychromasia  


 


Poikilocytosis  


 


Anisocytosis  


 


Microcytosis  


 


Macrocytosis  


 


Ovalocytes  


 


PT with INR  


 


INR  


 


PTT (Actin FS)  


 


Anticoagulation Therapy  


 


Puncture Site   Left brachial


 


ABG pH   7.49 H


 


ABG pCO2 at Pt Temp   39.9


 


ABG pO2 at Pt Temp   396 H


 


ABG HCO3   30.0 H


 


ABG O2 Sat (Measured)   99.6 H


 


ABG O2 Content   14.5 L


 


ABG Base Excess   6.5 H


 


Santino Test   Positive


 


Carboxyhemoglobin  


 


Methemoglobin  


 


O2 Delivery Device  


 


Oxygen Flow Rate   100


 


Vent Mode  


 


Vent Rate   18


 


Mechanical Rate   Yes


 


PEEP   5.0


 


Pressure Support Vent   500


 


Sodium  


 


Potassium  


 


Chloride  


 


Carbon Dioxide  


 


Anion Gap  


 


BUN  


 


Creatinine  


 


Creat Clearance w eGFR  


 


POC Glucometer  


 


Random Glucose  


 


Lactic Acid  2.7 H* 


 


Calcium  


 


Magnesium  


 


Total Bilirubin  


 


AST  


 


ALT  


 


Alkaline Phosphatase  


 


Creatine Kinase  


 


Troponin I  


 


B-Natriuretic Peptide  


 


Total Protein  


 


Albumin  


 


Urine Color  


 


Urine Appearance  


 


Urine pH  


 


Ur Specific Gravity  


 


Urine Protein  


 


Urine Glucose (UA)  


 


Urine Ketones  


 


Urine Blood  


 


Urine Nitrite  


 


Urine Bilirubin  


 


Urine Urobilinogen  


 


Ur Leukocyte Esterase  


 


Urine WBC (Auto)  


 


Urine RBC (Auto)  


 


Urine Casts (Auto)  


 


U Pathogenic Cast Auto  


 


U Epithel Cells (Auto)  


 


Urine Bacteria (Auto)  


 


Urine Yeast (Auto)  


 


Blood Type  


 


Antibody Screen  








 Current Medications











Generic Name Dose Route Start Last Admin





  Trade Name Freq  PRN Reason Stop Dose Admin


 


Albuterol/Ipratropium  1 amp  04/25/19 16:00  





  Duoneb -  NEB   





  RQ4H JOSE   





     





     





     





     


 


Fentanyl 500 mcg/ Dextrose  100 mls @ 10 mls/hr  04/25/19 11:15  04/25/19 12:00





  IVPB   50 mcg/hr





  TITR JOSE   10 mls/hr





     Administration





     





     





  50 MCG/HR   


 


Morphine Sulfate 100 mg/  100 mls @ 1 mls/hr  04/25/19 16:45  





  Sodium Chloride  IVPB   





  TITR JOSE   





     





     





  Protocol   





  1 MG/HR   


 


Lorazepam  2 mg  04/25/19 17:00  





  Ativan Injection -  IVPUSH   





  Q4H PRN   





  FOR COMFORT   





     





     





     

















ASSESSMENT AND PLAN:





78 year old male with history of COPD, CHF (unknown systolic or diastolic), HLD

, recently hospitalized with bilateral pleural effusions requiring chest tubes, 

presents from [facility] with respiratory distress and AMS, with hypercapneic 

respiratory failure with SaCO2 of 163, requiring intubation in ED. HCP who is 

his daughter opted for comfort measures and terminal extubation.





Acute Metabolic Encephalopathy and Acute Hypercapneic Respiratory Failure 

secondary to Acute COPD Exacerbation


Treated with DUoNebs, Solumedrol


Intubated and subsequently terminally extubated on request from HCP


Depressed respiratory rate and decreased air entry on my examination.





Comfort measures in place. Ativan prn and Morphine drip ongoing.

## 2019-04-25 NOTE — PN
Progress Note, Physician





- Current Medication List


Current Medications: 


Active Medications





Albuterol/Ipratropium (Duoneb -)  1 amp NEB RQ4H JOSE


Chlorhexidine Gluconate (Hibiclens For Decolonization -)  1 applic TP HS JOSE


Heparin Sodium (Porcine) (Heparin -)  5,000 unit SQ BID JOSE


Norepinephrine Bitartrate 4, (000 mcg/ Dextrose)  500 mls @ 37.5 mls/hr IV TITR 

JOSE; Protocol


   Last Titration: 04/25/19 10:50 Dose:  15 mcg/min, 112.5 mls/hr


Fentanyl 500 mcg/ Dextrose  100 mls @ 10 mls/hr IVPB TITR JOSE


   Last Admin: 04/25/19 12:00 Dose:  50 mcg/hr, 10 mls/hr


Propofol (Diprivan -)  1,000,000 mcg in 100 mls @ 1.225 mls/hr IV TITR JOSE; 

Protocol


   Last Admin: 04/25/19 15:25 Dose:  10 mcg/kg/min, 2.449 mls/hr


Methylprednisolone Sodium Succinate (Solu-Medrol -)  60 mg IVPUSH Q8H-IV JOSE


Mupirocin (Bactroban Ointment (For Decolonization) -)  1 applic NS BID JOSE


   Stop: 04/30/19 21:59











- Objective


Vital Signs: 


 Vital Signs











Temperature  97.7 F   04/25/19 15:57


 


Pulse Rate  138 H  04/25/19 14:00


 


Respiratory Rate  24 H  04/25/19 15:57


 


Blood Pressure  92/64   04/25/19 15:57


 


O2 Sat by Pulse Oximetry (%)  100   04/25/19 14:00











Labs: 


 CBC, BMP





 04/25/19 10:00 





 04/25/19 10:00 





 INR, PTT











INR  0.85  (0.83-1.09)   04/25/19  10:00

## 2019-04-25 NOTE — PN
Teaching Attending Note


Name of Resident: Travis Morfin





ATTENDING PHYSICIAN STATEMENT





I saw and evaluated the patient.


I reviewed the resident's note and discussed the case with the resident.


I agree with the resident's findings and plan as documented.








SUBJECTIVE:


Patient seen and examined in the ER.  78 M, COPD, asthma, CHF, HLD, recent PNA 

and recent pleural drainage of an effusion.  





Was apparently at [facility] and developed AMS and worsening shortness of 

breath. 





He was found lethargic and unresponsive around 8:45am this AM. 





In the ED, he developed hypotension and bradycardia.  He was subsequently 

endotracheally intubated.  Due to poor venous, bilateral IOs were inserted and 

he was started on NE for shock. 





Currently on NE @ 15 mcq. 





CXR: ETT in position, TLC in position, bilateral infiltrates, right effusion 





 Intake & Output











 04/22/19 04/23/19 04/24/19 04/25/19





 23:59 23:59 23:59 23:59


 


Output Total    5


 


Balance    -5


 


Weight    90 lb








 Last Vital Signs











Temp Pulse Resp BP Pulse Ox


 


 95.3 F L  132 H  18   147/107 H  100 


 


 04/25/19 10:55  04/25/19 12:30  04/25/19 12:30  04/25/19 12:30  04/25/19 12:30








Active Medications





Chlorhexidine Gluconate (Hibiclens For Decolonization -)  1 applic TP HS JOSE


Heparin Sodium (Porcine) (Heparin -)  5,000 unit SQ BID JOSE


Norepinephrine Bitartrate 4, (000 mcg/ Dextrose)  500 mls @ 37.5 mls/hr IV TITR 

JOSE; Protocol


   Last Titration: 04/25/19 10:50 Dose:  15 mcg/min, 112.5 mls/hr


Fentanyl 500 mcg/ Dextrose  100 mls @ 10 mls/hr IVPB TITR JOSE


   Last Admin: 04/25/19 12:00 Dose:  50 mcg/hr, 10 mls/hr


Propofol (Diprivan -)  1,000,000 mcg in 100 mls @ 1.225 mls/hr IV TITR JOSE; 

Protocol


Mupirocin (Bactroban Ointment (For Decolonization) -)  1 applic NS BID JOSE


   Stop: 04/30/19 21:59











Constitutional: Yes: Cachectic, intubated and sedated


Cardiovascular: Yes: Tachycardia, S1, S2.  No: Murmur


Respiratory: Yes: Mechanically Ventilated, bilateral coarse rhonchi and wheezes 


Gastrointestinal: Yes: Soft.  No: Distention, Splenomegaly


Edema: No, mottling 


Labs: 


  Laboratory Results - last 24 hr











  04/25/19 04/25/19 04/25/19





  09:42 10:00 10:00


 


WBC   6.7 


 


RBC   3.42 L 


 


Hgb   9.9 L 


 


Hct   31.5 L 


 


MCV   92.0 


 


MCH   28.8 


 


MCHC   31.3 L 


 


RDW   17.2 H 


 


Plt Count   258 


 


MPV   8.0 


 


Absolute Neuts (auto)   5.0 


 


Neutrophils %   No Result Required. 


 


Neutrophils % (Manual)   77.9 


 


Band Neutrophils %   2.1 


 


Lymphocytes %   No Result Required. 


 


Lymphocytes % (Manual)   14.7 


 


Monocytes % (Manual)   4 


 


Eosinophils % (Manual)   0.0 


 


Basophils % (Manual)   0.0 


 


Myelocytes % (Man)   1 


 


Promyelocytes % (Man)   0 


 


Blast Cells % (Manual)   0 


 


Nucleated RBC %   0 


 


Metamyelocytes   0 


 


Hypochromia   0 


 


Platelet Estimate   Normal 


 


Polychromasia   0 


 


Poikilocytosis   1+ 


 


Anisocytosis   1+ 


 


Microcytosis   0 


 


Macrocytosis   1+ 


 


Ovalocytes   1+ 


 


PT with INR    10.00


 


INR    0.85


 


PTT (Actin FS)    29.0


 


Anticoagulation Therapy   


 


Puncture Site   


 


ABG pH   


 


ABG pCO2 at Pt Temp   


 


ABG pO2 at Pt Temp   


 


ABG HCO3   


 


ABG O2 Sat (Measured)   


 


ABG O2 Content   


 


ABG Base Excess   


 


Santino Test   


 


Carboxyhemoglobin   


 


Methemoglobin   


 


O2 Delivery Device   


 


Oxygen Flow Rate   


 


Vent Mode   


 


Vent Rate   


 


Mechanical Rate   


 


Pressure Support Vent   


 


Sodium   


 


Potassium   


 


Chloride   


 


Carbon Dioxide   


 


Anion Gap   


 


BUN   


 


Creatinine   


 


Creat Clearance w eGFR   


 


POC Glucometer  121  


 


Random Glucose   


 


Lactic Acid   


 


Calcium   


 


Magnesium   


 


Total Bilirubin   


 


AST   


 


ALT   


 


Alkaline Phosphatase   


 


Creatine Kinase   


 


Troponin I   


 


B-Natriuretic Peptide   


 


Total Protein   


 


Albumin   


 


Urine Color   


 


Urine Appearance   


 


Urine pH   


 


Ur Specific Gravity   


 


Urine Protein   


 


Urine Glucose (UA)   


 


Urine Ketones   


 


Urine Blood   


 


Urine Nitrite   


 


Urine Bilirubin   


 


Urine Urobilinogen   


 


Ur Leukocyte Esterase   


 


Urine WBC (Auto)   


 


Urine RBC (Auto)   


 


Urine Casts (Auto)   


 


U Pathogenic Cast Auto   


 


U Epithel Cells (Auto)   


 


Urine Bacteria (Auto)   


 


Urine Yeast (Auto)   


 


Blood Type   


 


Antibody Screen   














  04/25/19 04/25/19 04/25/19





  10:00 10:00 10:00


 


WBC   


 


RBC   


 


Hgb   


 


Hct   


 


MCV   


 


MCH   


 


MCHC   


 


RDW   


 


Plt Count   


 


MPV   


 


Absolute Neuts (auto)   


 


Neutrophils %   


 


Neutrophils % (Manual)   


 


Band Neutrophils %   


 


Lymphocytes %   


 


Lymphocytes % (Manual)   


 


Monocytes % (Manual)   


 


Eosinophils % (Manual)   


 


Basophils % (Manual)   


 


Myelocytes % (Man)   


 


Promyelocytes % (Man)   


 


Blast Cells % (Manual)   


 


Nucleated RBC %   


 


Metamyelocytes   


 


Hypochromia   


 


Platelet Estimate   


 


Polychromasia   


 


Poikilocytosis   


 


Anisocytosis   


 


Microcytosis   


 


Macrocytosis   


 


Ovalocytes   


 


PT with INR   


 


INR   


 


PTT (Actin FS)   


 


Anticoagulation Therapy   


 


Puncture Site   


 


ABG pH   


 


ABG pCO2 at Pt Temp   


 


ABG pO2 at Pt Temp   


 


ABG HCO3   


 


ABG O2 Sat (Measured)   


 


ABG O2 Content   


 


ABG Base Excess   


 


Santino Test   


 


Carboxyhemoglobin   


 


Methemoglobin   


 


O2 Delivery Device   


 


Oxygen Flow Rate   


 


Vent Mode   


 


Vent Rate   


 


Mechanical Rate   


 


Pressure Support Vent   


 


Sodium  144  


 


Potassium  3.5  


 


Chloride  96 L  


 


Carbon Dioxide  > 45 H  


 


Anion Gap  3 L  


 


BUN  34 H  


 


Creatinine  0.8  


 


Creat Clearance w eGFR  93.01  


 


POC Glucometer   


 


Random Glucose  130 H  


 


Lactic Acid   0.8 


 


Calcium  8.2 L  


 


Magnesium   


 


Total Bilirubin  0.3  


 


AST  14 L  


 


ALT  15  


 


Alkaline Phosphatase  68  


 


Creatine Kinase  28  


 


Troponin I   


 


B-Natriuretic Peptide   


 


Total Protein  4.6 L  


 


Albumin  2.2 L  


 


Urine Color   


 


Urine Appearance   


 


Urine pH   


 


Ur Specific Gravity   


 


Urine Protein   


 


Urine Glucose (UA)   


 


Urine Ketones   


 


Urine Blood   


 


Urine Nitrite   


 


Urine Bilirubin   


 


Urine Urobilinogen   


 


Ur Leukocyte Esterase   


 


Urine WBC (Auto)   


 


Urine RBC (Auto)   


 


Urine Casts (Auto)   


 


U Pathogenic Cast Auto   


 


U Epithel Cells (Auto)   


 


Urine Bacteria (Auto)   


 


Urine Yeast (Auto)   


 


Blood Type    A POSITIVE


 


Antibody Screen    Negative














  04/25/19 04/25/19 04/25/19





  10:00 10:11 11:12


 


WBC   


 


RBC   


 


Hgb   


 


Hct   


 


MCV   


 


MCH   


 


MCHC   


 


RDW   


 


Plt Count   


 


MPV   


 


Absolute Neuts (auto)   


 


Neutrophils %   


 


Neutrophils % (Manual)   


 


Band Neutrophils %   


 


Lymphocytes %   


 


Lymphocytes % (Manual)   


 


Monocytes % (Manual)   


 


Eosinophils % (Manual)   


 


Basophils % (Manual)   


 


Myelocytes % (Man)   


 


Promyelocytes % (Man)   


 


Blast Cells % (Manual)   


 


Nucleated RBC %   


 


Metamyelocytes   


 


Hypochromia   


 


Platelet Estimate   


 


Polychromasia   


 


Poikilocytosis   


 


Anisocytosis   


 


Microcytosis   


 


Macrocytosis   


 


Ovalocytes   


 


PT with INR   


 


INR   


 


PTT (Actin FS)   


 


Anticoagulation Therapy   No Result Required. 


 


Puncture Site   No Result Required. 


 


ABG pH   7.05 L* 


 


ABG pCO2 at Pt Temp   163 H* 


 


ABG pO2 at Pt Temp   58.0 L 


 


ABG HCO3   43.5 H 


 


ABG O2 Sat (Measured)   81.2 L 


 


ABG O2 Content   11.3 L 


 


ABG Base Excess   9.0 H 


 


Santino Test   No Result Required. 


 


Carboxyhemoglobin   0.5 


 


Methemoglobin   0.8 


 


O2 Delivery Device   No Result Required. 


 


Oxygen Flow Rate   No Result Required. 


 


Vent Mode   No Result Required. 


 


Vent Rate   No Result Required. 


 


Mechanical Rate   No Result Required. 


 


Pressure Support Vent   No Result Required. 


 


Sodium   


 


Potassium   


 


Chloride   


 


Carbon Dioxide   


 


Anion Gap   


 


BUN   


 


Creatinine   


 


Creat Clearance w eGFR   


 


POC Glucometer   


 


Random Glucose   


 


Lactic Acid   


 


Calcium   


 


Magnesium  2.2  


 


Total Bilirubin   


 


AST   


 


ALT   


 


Alkaline Phosphatase   


 


Creatine Kinase   


 


Troponin I  0.02  


 


B-Natriuretic Peptide  1086.2 H  


 


Total Protein   


 


Albumin   


 


Urine Color    Orange


 


Urine Appearance    Cloudy


 


Urine pH    7.0


 


Ur Specific Gravity    1.010


 


Urine Protein    2+ H


 


Urine Glucose (UA)    Negative


 


Urine Ketones    Trace H


 


Urine Blood    3+ H


 


Urine Nitrite    Negative


 


Urine Bilirubin    Negative


 


Urine Urobilinogen    0.2


 


Ur Leukocyte Esterase    3+ H


 


Urine WBC (Auto)    319


 


Urine RBC (Auto)    952.7


 


Urine Casts (Auto)    14


 


U Pathogenic Cast Auto    None seen


 


U Epithel Cells (Auto)    1.7


 


Urine Bacteria (Auto)    15.2


 


Urine Yeast (Auto)    Present


 


Blood Type   


 


Antibody Screen   

















Assessment/Plan


Acute Respiratory Failure


Acute on Chronic Hypercapneic Respiratory Failure 


Suspected septic shock due to PNA 


AE of COPD


Asthma


CHF


HPL 


Right pleural effusion 


Anemia 








ABX per ID 


BD TX standing and PRN 


Pan-culture


Normal transfusion thresholds 


Follow daily Pplat


Check CVP to guide IVF 


Sedate for vent synchrony 


VTE prophylaxis 


Follow ABG 


Requires ICU monitoring 





Patient DNR / DNI 





Dr Pickard 


Critical care time spent in reviewing chart, evaluating patient and formulating 

plan - 36 minutes.

## 2019-04-25 NOTE — CONSULT
Consult


Consult Specialty:: Pulm/CCM


Referred by:: Dr. Sumner


Reason for Consultation:: Acute on chronic hypercapnic respiratory failure





- History of Present Illness


History of Present Illness: 





77 yo M h/o COPD, asthma, CHF, HLD BIBEMS from [facility] for AMS and worsening 

shortness of breath. Per chart and EMS, patient appeared lethargic and 

unresponsive around 8:45am. Received nebs and on non-rebreather at NH with no 

improvement. In the ED, he became hypotenisve and bradycardic with MAP of 50 to 

60. Received NE 1mg, bicarb, and 2L of fluids. 2 I/O and 1 AC accesses created 

during acute management in ED. Patient was recently admitted to [facility] for PNA, 

pleural effusion, for which he had a chest tube inserted. First blood gas prior 

to intubation showed pH 7.05 and pCO2 of 163. 





- History Source


History Provided By: Family Member





- Alcohol/Substance Use


Hx Alcohol Use: No





- Smoking History


Smoking history: Unknown if ever smoked


Have you smoked in the past 12 months: No


Aproximately how many cigarettes per day: 0





Home Medications





- Allergies


Allergies/Adverse Reactions: 


 Allergies











Allergy/AdvReac Type Severity Reaction Status Date / Time


 


amoxicillin Allergy Unknown  Verified 04/25/19 12:52














- Home Medications


Home Medications: 


Ambulatory Orders





Atorvastatin Ca [Lipitor] 10 mg PO HS 04/25/19 


Calcium Carbonate/Vitamin D3 [Calcium 600-Vit D3 400 Tablet] 1 each PO DAILY 04/ 25/19 


Docusate Sodium [Colace] 100 mg PO BID 04/25/19 


Fluticasone/Salmeterol [Advair 250-50 Diskus] 1 each IH BID 04/25/19 


Furosemide [Lasix] 40 mg PO DAILY 04/25/19 


Ipratropium/Albuterol Sulfate [Iprat-Albut 0.5-3(2.5) mg/3 ml] 3 ml IH Q4H 04/25 /19 


Metformin HCl [Metformin HCl ER] 500 mg PO BID 04/25/19 


Nut.tx.gluc.intoler,Lac-Fr,Soy [Glucerna Advance] 240 ml PO DAILY 04/25/19 


Prednisone 5 mg PO BID 04/25/19 


Sennosides [Sen-O-Tab] 8.6 mg PO HS 04/25/19 


Tamsulosin HCl 0.4 mg PO HS 04/25/19 


Tiotropium Bromide [Spiriva] 2 inh PO DAILY 04/25/19 


Triamcinolone 0.025% Cream [Aristocort] 0 gm TP DAILY 04/25/19 











Review of Systems


Unable to obtain ROS, reason: Intubated and sedated





Physical Exam


Vital Signs: 


 Vital Signs











Temperature  35.2 C L  04/25/19 10:55


 


Pulse Rate  132 H  04/25/19 12:30


 


Respiratory Rate  18   04/25/19 12:30


 


Blood Pressure  147/107 H  04/25/19 12:30


 


O2 Sat by Pulse Oximetry (%)  100   04/25/19 12:30











Constitutional: Yes: Cachectic, Other (intubated and sedated)


Cardiovascular: Yes: Tachycardia, S1, S2.  No: Murmur


Respiratory: Yes: Mechanically Ventilated


Gastrointestinal: Yes: Soft.  No: Distention, Splenomegaly


Edema: No


Labs: 


 CBC, BMP





 04/25/19 10:00 





 04/25/19 10:00 











Assessment/Plan


77 yo M h/o COPD, asthma, CHF, HLD BIBEMS from [facility] for AMS and 

tachypnea. Patient is then intubated and started on pressor for hemodynamic 

support. Now admitted to the ICU for acute on chronic hypercapnic respiratory 

failure.





Pulm: COPD vs. CHF exacerbation


- Intubated, sedated, on mechanical ventilation


   * severely acidotic, will readjust vent settings based on 2nd blood gas


- Bronchodilators JOSE and PRN


- medrol 60mg IV Q6H


- Follow PIP and Pplt daily


- Add propofol gtt for better vent synchrony





CV: tachycardia and hypertension


- Will titrate down the pressor





ID: 


- Afebrile and no WBC


- b/l congestive changes/infiltrates on CXR


- ID consult





Heme: normocytic anemia


- unknown etiology and baseline H&H


- likely chronic disease


- cont. to monitor H&H





FEN


- fluid PRN based on IVP with goal of 8-12


- replete lytes as needed


- NPO





Prophylaxis


- DVT: heparin TID


- GI: not indicated now





Dispo: cont. to monitor in ICU








Travis Morfin PGY3





Visit type





- Emergency Visit


Emergency Visit: Yes


ED Registration Date: 04/25/19


Care time: The patient presented to the Emergency Department on the above date 

and was hospitalized for further evaluation of their emergent condition.





- New Patient


This patient is new to me today: Yes


Date on this admission: 04/25/19





- Critical Care


Critical Care patient: Yes


Total Critical Care Time (in minutes): 35


Critical Care Statement: The care of this patient involved high complexity 

decision making to prevent further life threatening deterioration of the patient

's condition and/or to evaluate & treat vital organ system(s) failure or risk 

of failure.

## 2019-04-25 NOTE — PN
Progress Note (short form)





- Note


Progress Note: 





Discussed with two daughters and wife at bedside. Healthcare proxy Cesilia SAMANO, 

form in the chart. 


Family and HCP do not feel that this is not Kam would have wanted. All 

present family in agreement on pt's previously expressed wishes.


Cesilia wants to stop interventions, imaging, blood draws, antibiotics and 

pressors at this time and remove the ventilatory support. 


I explained the process of terminal weaning and withdrawal of care including 

morphine for pain control and comfort measure as the GOC. family in agreement 

with measures. form signed and placed in chart with HCP form.





presented case to Rosalino at [facility] [employer] [facility], case number: [id number]. spoke 

with Neeta, pt is not a candidate at this time. requests call back with status 

after terminal wean. 





pt was given ativan, placed on morphine ttr for comfort and extubated. Family 

remained at bedside. 


pt became asystolic on monitor at 5:35pm. 


on PE pt nonresponsive to physical or verbal stimuli, no heart or lung sounds, 

no spontaneous breaths, no palpable pulses.


family made aware pt has . support provided, questions answered. 





[facility] informed.
